# Patient Record
Sex: MALE | Race: WHITE | Employment: FULL TIME | URBAN - METROPOLITAN AREA
[De-identification: names, ages, dates, MRNs, and addresses within clinical notes are randomized per-mention and may not be internally consistent; named-entity substitution may affect disease eponyms.]

---

## 2023-11-27 ENCOUNTER — HOSPITAL ENCOUNTER (OUTPATIENT)
Facility: HOSPITAL | Age: 48
Setting detail: OUTPATIENT SURGERY
Discharge: HOME | End: 2023-11-27
Attending: INTERNAL MEDICINE | Admitting: INTERNAL MEDICINE
Payer: COMMERCIAL

## 2023-11-27 VITALS
DIASTOLIC BLOOD PRESSURE: 61 MMHG | HEART RATE: 66 BPM | OXYGEN SATURATION: 96 % | WEIGHT: 195.9 LBS | HEIGHT: 69 IN | BODY MASS INDEX: 29.02 KG/M2 | RESPIRATION RATE: 18 BRPM | TEMPERATURE: 97.8 F | SYSTOLIC BLOOD PRESSURE: 100 MMHG

## 2023-11-27 PROCEDURE — 3600007512: Performed by: INTERNAL MEDICINE

## 2023-11-27 PROCEDURE — 2709999900 HC NON-CHARGEABLE SUPPLY: Performed by: INTERNAL MEDICINE

## 2023-11-27 PROCEDURE — 6360000002 HC RX W HCPCS: Performed by: INTERNAL MEDICINE

## 2023-11-27 PROCEDURE — 2580000003 HC RX 258: Performed by: INTERNAL MEDICINE

## 2023-11-27 PROCEDURE — 99152 MOD SED SAME PHYS/QHP 5/>YRS: CPT | Performed by: INTERNAL MEDICINE

## 2023-11-27 PROCEDURE — 7100000010 HC PHASE II RECOVERY - FIRST 15 MIN: Performed by: INTERNAL MEDICINE

## 2023-11-27 PROCEDURE — 7100000011 HC PHASE II RECOVERY - ADDTL 15 MIN: Performed by: INTERNAL MEDICINE

## 2023-11-27 PROCEDURE — 99153 MOD SED SAME PHYS/QHP EA: CPT | Performed by: INTERNAL MEDICINE

## 2023-11-27 PROCEDURE — 3600007502: Performed by: INTERNAL MEDICINE

## 2023-11-27 RX ORDER — FENTANYL CITRATE 50 UG/ML
INJECTION, SOLUTION INTRAMUSCULAR; INTRAVENOUS PRN
Status: DISCONTINUED | OUTPATIENT
Start: 2023-11-27 | End: 2023-11-27 | Stop reason: ALTCHOICE

## 2023-11-27 RX ORDER — SODIUM CHLORIDE 9 MG/ML
INJECTION, SOLUTION INTRAVENOUS CONTINUOUS
Status: DISCONTINUED | OUTPATIENT
Start: 2023-11-27 | End: 2023-11-27 | Stop reason: HOSPADM

## 2023-11-27 RX ORDER — MIDAZOLAM HYDROCHLORIDE 1 MG/ML
INJECTION INTRAMUSCULAR; INTRAVENOUS PRN
Status: DISCONTINUED | OUTPATIENT
Start: 2023-11-27 | End: 2023-11-27 | Stop reason: ALTCHOICE

## 2023-11-27 RX ADMIN — SODIUM CHLORIDE: 9 INJECTION, SOLUTION INTRAVENOUS at 14:38

## 2023-11-27 ASSESSMENT — PAIN - FUNCTIONAL ASSESSMENT: PAIN_FUNCTIONAL_ASSESSMENT: 0-10

## 2023-11-27 NOTE — PROCEDURES
Descending Colon:   Normal   Transverse Colon:   Normal   Ascending Colon:   Normal  Cecum:   Normal  Terminal Ileum:   Normal.       Unplanned Events: There were no unplanned events. Estimated Blood Loss: None  IMPLANTS: * No implants in log *  Impressions: Tiny internal hemorrhoids. Slightly tortuous sigmoid colon. Normal Mucosa. No blood, polyps, diverticula or AVM found. Complications: None; patient tolerated the procedure well. Recommendations:  Discharge home when standard parameters are met. Resume a high fiber diet. Resume own medications. Colonoscopy recommendation in 10 years.       Procedure Codes:    COLONOSCOPY [EHJ400]    Endoscope Information:  Model Number(s)    [id number]   Assistant: None  Signed By: Kimberley Solis MD Date: 11/27/2023

## 2023-11-27 NOTE — DISCHARGE INSTRUCTIONS
circulation or nerve impairment to extremity: change in color, persistent  numbness, tingling, coldness or increase pain  Any questions    What to do at Home:  Recommended activity: activity as tolerated and no driving for today. If you experience any of the following symptoms as above, please follow up with Dr. Brit Lyons. *  Please give a list of your current medications to your Primary Care Provider. *  Please update this list whenever your medications are discontinued, doses are      changed, or new medications (including over-the-counter products) are added. *  Please carry medication information at all times in case of emergency situations. These are general instructions for a healthy lifestyle:    No smoking/ No tobacco products/ Avoid exposure to second hand smoke  Surgeon General's Warning:  Quitting smoking now greatly reduces serious risk to your health. Obesity, smoking, and sedentary lifestyle greatly increases your risk for illness    A healthy diet, regular physical exercise & weight monitoring are important for maintaining a healthy lifestyle    You may be retaining fluid if you have a history of heart failure or if you experience any of the following symptoms:  Weight gain of 3 pounds or more overnight or 5 pounds in a week, increased swelling in our hands or feet or shortness of breath while lying flat in bed. Please call your doctor as soon as you notice any of these symptoms; do not wait until your next office visit. Patient armband removed and shredded. The discharge information has been reviewed with the patient and spouse. The patient and spouse verbalized understanding. Discharge medications reviewed with the patient and spouse and appropriate educational materials and side effects teaching were provided.   ___________________________________________________________________________________________________________________________________

## 2023-11-27 NOTE — H&P
Assessment/Plan  # Detail Type Description    1. Assessment Encounter for screening for malignant neoplasm of colon (Z12.11). Impression Pt of Dr. Felisha Moura, here for his first screening colonoscopy. Average risk, no FHx of colon cancer. Asymptomatic of GI complaints. BMI: 29.6, BM: 1-2x daily. No reported hx of abdominal surgeries, strokes, or CAD. Patient Plan *C-scope Plan:  First colonoscopy ordered with Dr. Fide Small with Golytely/Gavilyte bowel prep, and Miralax and stool softeners starting 3 days before prep. *C-scope Risks:  Stressed importance of following all bowel preparation instructions. Explained the procedure to the patient including all risks and benefits. These risks consist of missed lesions on exam, bleeding, and bowel perforation with possible need for admission to the hospital, and in the most extensive of  circumstances, the patient may require surgery. Pt verbalized understanding of these risks and is agreeable with this procedure    Plan Orders Further diagnostic evaluations ordered today include(s) DIAGNOSTIC COLONOSCOPY to be performed. and DIAGNOSTIC COLONOSCOPY to be performed. This 56year old  patient was referred by Gio Sal. This 56year old male presents for Colon Cancer Screening. History of Present Illness  1. Colon Cancer Screening   No prior screening. Denies risk factors. Pertinent negatives include abdominal pain, change in bowel habits, change in stool caliber, constipation, decreased appetite, diarrhea, melena, nausea, rectal bleeding, vomiting, weight gain and weight loss. Additional information: No family history of colon cancer and BM: 1-2x daily.         Past Medical/Surgical History   (Detailed)        Family History   (Detailed)    Relationship Family Member Name  Age at Death Condition Onset Age Cause of Death       Family history of Diverticulitis  N   Family History Comments  Relationship Family Member Name Condition

## (undated) DEVICE — CANNULA CUSH AD W/ 14FT TBG

## (undated) DEVICE — SYRINGE MED 5ML STD CLR PLAS LUERLOCK TIP N CTRL DISP

## (undated) DEVICE — SYRINGE 50ML E/T

## (undated) DEVICE — TUBING, SUCTION, 1/4" X 12', STRAIGHT: Brand: MEDLINE

## (undated) DEVICE — SOLUTION IV 1000ML 0.9% SOD CHL PH 5 INJ USP VIAFLX PLAS

## (undated) DEVICE — CATHETER IV 22GA L1IN BLU POLYUR STR HUB RADPQ PROTCT +

## (undated) DEVICE — SYRINGE MEDICAL 3ML CLEAR PLASTIC STANDARD NON CONTROL LUERLOCK TIP DISPOSABLE

## (undated) DEVICE — WRISTBAND ID AD W2.5XL9.5CM RED VYN ADH CLSR UNI-PRINT

## (undated) DEVICE — TOURNIQUET PHLEB W1XL18IN BLU FLAT RL AND BND REUSE FOR IV

## (undated) DEVICE — BLUNTFILL: Brand: MONOJECT

## (undated) DEVICE — CATHETER PH SUCT 14FR

## (undated) DEVICE — KENDALL RADIOLUCENT FOAM MONITORING ELECTRODE RECTANGULAR SHAPE: Brand: KENDALL

## (undated) DEVICE — Device